# Patient Record
(demographics unavailable — no encounter records)

---

## 2025-02-25 NOTE — PHYSICAL EXAM
[Chaperone Present] : A chaperone was present in the examining room during all aspects of the physical examination [FreeTextEntry2] : BENITA TOVAR [Labia Majora] : labia major [Labia Minora] : labia minora [Normal] : clitoris [No Bleeding] : there was no active vaginal bleeding [FreeTextEntry4] : Left-sided vaginal cyst.  States "cleaning with Betadine swab x 2.  IUD abscess clear yellowish drainage noted.

## 2025-02-25 NOTE — CHIEF COMPLAINT
[Urgent Visit] : Urgent Visit [FreeTextEntry1] : 54-year-old female presents complaining of bump in vagina.  Patient states she noticed this in the shower 2 days ago.  Patient states monogamous no irregular bleeding or vaginal discharge.

## 2025-03-12 NOTE — HISTORY OF PRESENT ILLNESS
[FreeTextEntry1] : Patient is a 54-year-old here for routine annual gynecological follow-up without complaints. Patient is here to 2525 with an I&D of a vaginal cyst which is no longer a concern to the patient. Patient had a mammogram and sonogram at [facility] 5/20/2023. Colonoscopy was last 3 years ago Patient's last bone density was 2018

## 2025-03-12 NOTE — PHYSICAL EXAM
[Chaperone Present] : A chaperone was present in the examining room during all aspects of the physical examination [Appropriately responsive] : appropriately responsive [Alert] : alert [No Acute Distress] : no acute distress [No Lymphadenopathy] : no lymphadenopathy [Soft] : soft [Non-tender] : non-tender [Non-distended] : non-distended [No HSM] : No HSM [No Lesions] : no lesions [No Mass] : no mass [Oriented x3] : oriented x3 [Examination Of The Breasts] : a normal appearance [No Masses] : no breast masses were palpable [Labia Majora] : normal [Labia Minora] : normal [Normal] : normal [Retroversion] : retroverted [Uterine Adnexae] : normal [FreeTextEntry2] : Karmen virk [FreeTextEntry9] : Guaiac-negative no masses

## 2025-03-12 NOTE — PLAN
[FreeTextEntry1] : Patient with a normal annual gynecological follow-up. Resolved vaginal cyst follow-up as needed. Menopausal health was discussed with the patient. Pap smear was done Breast examination instructed. Mammogram sonogram and bone densitometry ordered at [facility]

## 2025-03-31 NOTE — CHIEF COMPLAINT
[Urgent Visit] : Urgent Visit [FreeTextEntry1] : Is a 54-year-old female who presents for urgent visit complaining of left vaginal wall cyst.  Patient was seen in February regarding same cyst had a drain.  Patient states cyst is only uncomfortable with intercourse.  Patient denies any fever chills or dysuria.

## 2025-03-31 NOTE — PROCEDURE
[Left] : left [I&D] : an I&D was performed [Clear Fluid] : the fluid was clear [None] : none [Tolerated Well] : the patient tolerated the procedure well [No Complications] : there were no complications

## 2025-03-31 NOTE — PHYSICAL EXAM
[Chaperone Present] : A chaperone was present in the examining room during all aspects of the physical examination [FreeTextEntry2] : Sandra [Labia Majora] : labia major [Labia Minora] : labia minora [Normal] : clitoris [No Bleeding] : there was no active vaginal bleeding [FreeTextEntry4] : Note left wall vaginal mucinous cyst.  Area cleaned and I&D clear yellow mucinous drainage.  Vaginal culture sent